# Patient Record
Sex: MALE | Race: BLACK OR AFRICAN AMERICAN | Employment: OTHER | URBAN - METROPOLITAN AREA
[De-identification: names, ages, dates, MRNs, and addresses within clinical notes are randomized per-mention and may not be internally consistent; named-entity substitution may affect disease eponyms.]

---

## 2022-02-15 ENCOUNTER — OFFICE VISIT (OUTPATIENT)
Dept: ORTHOPEDIC SURGERY | Age: 77
End: 2022-02-15
Payer: MEDICARE

## 2022-02-15 VITALS — WEIGHT: 182 LBS | HEIGHT: 66 IN | BODY MASS INDEX: 29.25 KG/M2

## 2022-02-15 DIAGNOSIS — M54.50 LUMBAR BACK PAIN: Primary | ICD-10-CM

## 2022-02-15 DIAGNOSIS — Z98.1 HISTORY OF LUMBAR SPINAL FUSION: ICD-10-CM

## 2022-02-15 PROCEDURE — G8432 DEP SCR NOT DOC, RNG: HCPCS | Performed by: PHYSICIAN ASSISTANT

## 2022-02-15 PROCEDURE — G8419 CALC BMI OUT NRM PARAM NOF/U: HCPCS | Performed by: PHYSICIAN ASSISTANT

## 2022-02-15 PROCEDURE — G8427 DOCREV CUR MEDS BY ELIG CLIN: HCPCS | Performed by: PHYSICIAN ASSISTANT

## 2022-02-15 PROCEDURE — 99212 OFFICE O/P EST SF 10 MIN: CPT | Performed by: PHYSICIAN ASSISTANT

## 2022-02-15 PROCEDURE — G8536 NO DOC ELDER MAL SCRN: HCPCS | Performed by: PHYSICIAN ASSISTANT

## 2022-02-15 PROCEDURE — 1101F PT FALLS ASSESS-DOCD LE1/YR: CPT | Performed by: PHYSICIAN ASSISTANT

## 2022-02-15 NOTE — PROGRESS NOTES
Jay Diez (: 1945) is a 68 y.o. male, established patient, here for evaluation of the following chief complaint(s):  Back Pain       SUBJECTIVE/OBJECTIVE:  Jay Diez (: 1945) is a 68 y.o. male. Lumbar laminectomy and fusion L4-S1 2019 with Dr. Venkat Moore    Patient states he is doing well at this time and is having minimal if any back discomfort. Patient states that he is active and plays golf and walks regularly for exercise without discomfort. Patient states he has occasional \"tightness\" in the right lateral thigh. Patient states he does not take any pain medication for his back on a routine basis. Patient states that his back does not limit his activities. Patient rates his current level of pain as a 1/10. Patient denies lower extremity numbness, tingling or weakness. No Known Allergies    Current Outpatient Medications   Medication Sig    lisinopril (PRINIVIL, ZESTRIL) 10 mg tablet Take 10 mg by mouth daily.  rosuvastatin (CRESTOR) 10 mg tablet Take 10 mg by mouth nightly.  cholecalciferol (VITAMIN D3) 1,000 unit tablet Take  by mouth daily.  valACYclovir (VALTREX) 500 mg tablet Take 500 mg by mouth two (2) times a day.  HYDROcodone-acetaminophen (NORCO) 5-325 mg per tablet Take 1 Tab by mouth every four (4) hours as needed for Pain.  ondansetron hcl (ZOFRAN) 4 mg tablet Take 1 Tab by mouth every eight (8) hours as needed for Nausea.  loperamide (IMODIUM) 2 mg capsule Take 1 Cap by mouth four (4) times daily as needed for Diarrhea. No current facility-administered medications for this visit.        Social History     Socioeconomic History    Marital status:      Spouse name: Not on file    Number of children: Not on file    Years of education: Not on file    Highest education level: Not on file   Occupational History    Not on file   Tobacco Use    Smoking status: Former Smoker    Smokeless tobacco: Not on file   Substance and Sexual Activity    Alcohol use: No    Drug use: No    Sexual activity: Not on file   Other Topics Concern    Not on file   Social History Narrative    Not on file     Social Determinants of Health     Financial Resource Strain:     Difficulty of Paying Living Expenses: Not on file   Food Insecurity:     Worried About Running Out of Food in the Last Year: Not on file    Isadora of Food in the Last Year: Not on file   Transportation Needs:     Lack of Transportation (Medical): Not on file    Lack of Transportation (Non-Medical): Not on file   Physical Activity:     Days of Exercise per Week: Not on file    Minutes of Exercise per Session: Not on file   Stress:     Feeling of Stress : Not on file   Social Connections:     Frequency of Communication with Friends and Family: Not on file    Frequency of Social Gatherings with Friends and Family: Not on file    Attends Sikh Services: Not on file    Active Member of [address] [place] [address] or Organizations: Not on file    Attends Club or Organization Meetings: Not on file    Marital Status: Not on file   Intimate Partner Violence:     Fear of Current or Ex-Partner: Not on file    Emotionally Abused: Not on file    Physically Abused: Not on file    Sexually Abused: Not on file   Housing Stability:     Unable to Pay for Housing in the Last Year: Not on file    Number of [place] in the Last Year: Not on file    Unstable Housing in the Last Year: Not on file       Past Surgical History:   Procedure Laterality Date    HX HERNIA REPAIR      left lower groin    HX KNEE ARTHROSCOPY      right knee    HX ORTHOPAEDIC      right foot surgery     HX SHOULDER ARTHROSCOPY         No family history on file. REVIEW OF SYSTEMS:    Patient denies any recent fever, chills, nausea, vomiting, chest pain, abdominal pain, blurred vision, dizziness or shortness of breath.       Vitals:  Ht 5' 6\" (1.676 m)   Wt 82.6 kg (182 lb)   BMI 29.38 kg/m²    Body mass index is 29.38 kg/m². PHYSICAL EXAM:    The patient is alert and oriented x3 and in no acute distress. Patient ambulates with a normal gait without gait aids. Sensory testing in all major nerve distributions in the lower extremities is intact and symmetric. Manual motor testing of the major muscle groups of the lower extremities including dorsiflexion/EHL/plantarflexion, knee flexion/extension, hip flexion/extension/abduction/adduction is intact and symmetric in the bilateral lower extremities. Deep tendon reflexes +2/4 and symmetric in the bilateral knees and ankles. Negative straight leg raise bilaterally. No evidence of clonus bilaterally. Babinski's downgoing and symmetric bilaterally. Distal pulses 2+ and symmetric bilaterally. There is no tenderness to palpation of the thoracic, lumbar or sacral regions. IMAGING:    AP, lateral, flexion and extension digital view radiographs of the lumbar spine obtained in the office today and was reviewed and demonstrate good alignment of posterior instrumentation L4-S1 with no evidence of hardware loosening, subsidence or failure. There is bony arthrodesis in the bilateral gutters. The adjacent segment above the proximal fusion level at L3-L4 remains well maintained there are no gross motion segments seen on flexion/extension views. Overall lumbar alignment is lordotic. There is no evidence of fracture, lytic lesion or acute bony abnormality. Orders Placed This Encounter    XR SPINE LUMB MIN 4 V     2B     Standing Status:   Future     Number of Occurrences:   1     Standing Expiration Date:   2/16/2023          ASSESSMENT/PLAN:      1. Lumbar back pain  -     XR SPINE LUMB MIN 4 V; Future  2. History of lumbar spinal fusion        Below is the assessment and plan developed based on review of pertinent history, physical exam, labs, studies, and medications. Discussed radiographic findings and answered all patient questions to his satisfaction.   Patient was encouraged to remain active and to be participate in low impact activities as tolerated. Patient was asked to contact the office with any questions or concerns. We will plan on seeing the patient back for follow-up on an as-needed basis. The patient understands and aggrees to the treatment plan as outlined above. Dr. Jovanny Mcclelland was available for immediate consultation as needed. Return if symptoms worsen or fail to improve. Dr. Jovanny Mcclelland was available for immediate consultation as needed. An electronic signature was used to authenticate this note.   -- Cass Cuevas PA-C

## 2023-01-17 RX ORDER — ATORVASTATIN CALCIUM 20 MG/1
TABLET, FILM COATED ORAL DAILY
COMMUNITY

## 2023-01-17 RX ORDER — ASPIRIN 81 MG/1
81 TABLET ORAL DAILY
COMMUNITY

## 2023-01-17 RX ORDER — IRBESARTAN 150 MG/1
150 TABLET ORAL DAILY
COMMUNITY

## 2023-01-17 RX ORDER — CHOLECALCIFEROL TAB 125 MCG (5000 UNIT) 125 MCG
TAB ORAL DAILY
COMMUNITY

## 2023-01-17 RX ORDER — ASCORBIC ACID 500 MG
1000 TABLET ORAL DAILY
COMMUNITY

## 2023-01-17 RX ORDER — LANOLIN ALCOHOL/MO/W.PET/CERES
2500 CREAM (GRAM) TOPICAL DAILY
COMMUNITY

## 2023-01-17 RX ORDER — MULTIVITAMIN
1 TABLET ORAL DAILY
COMMUNITY

## 2023-01-17 NOTE — PERIOP NOTES
As  per patient, he was not given instructions yet regarding Aspirin medication pre-op. 1511: Spoke to [place] at Dr. Dianna Mendoza office and as per same patient can take Aspirin pre-op. 1523: Spoke to patient and instructed that he can take his Aspirin medication as scheduled. No need to stop pre-op. Able to verbalized understanding.

## 2023-01-17 NOTE — PERIOP NOTES
[facility]  Ambulatory Surgery Unit  Pre-operative Instructions    Surgery/Procedure Date      1/24/2023     Tentative Arrival Time TBD      1. On the day of your surgery/procedure, please report to the Ambulatory Surgery Unit Registration Desk and sign in at your designated time. The Ambulatory Surgery Unit is located in [facility] on the [facility] side of the [facility] across from the [address] [place] [address]. Please have all of your health insurance cards, co-payment, and a photo ID.    **TWO adults may accompany you the day of the procedure. We have limited seating available. If our waiting room is at capacity, your ride may be asked to remain in their vehicle. No one under 15 is allowed in the waiting room. 2. You must have someone with you to drive you home, as you should not drive a car for 24 hours following anesthesia. Please make arrangements for a responsible adult friend or family member to stay with you for at least the first 24 hours after your surgery. 3. Do not have anything to eat or drink (including water, gum, mints, coffee, juice) after 11:59 PM  1/23/23. This may not apply to medications prescribed by your physician. (Please note below the special instructions with medications to take the morning of surgery, if applicable.)    4. We recommend you do not drink any alcoholic beverages for 24 hours before and after your surgery. 5. Contact your surgeons office for instructions on the following medications: non-steroidal anti-inflammatory drugs (i.e. Advil, Aleve), vitamins, and supplements. (Some surgeons will want you to stop these medications prior to surgery and others may allow you to take them)   **If you are currently taking Plavix, Coumadin, Aspirin and/or other blood-thinning agents, contact your surgeon for instructions. ** Your surgeon will partner with the physician prescribing these medications to determine if it is safe to stop or if you need to continue taking. Please do not stop taking these medications without instructions from your surgeon. 6. In an effort to help prevent surgical site infection, we ask that you shower with an anti-bacterial soap (i.e. Dial/Safeguard, or the soap provided to you at your preadmission testing appointment)  on the morning of surgery, using a fresh towel after each shower. (Please begin this process with fresh bed linens.) Do not apply any lotions, powders, or deodorants after the shower on the day of your procedure. If applicable, please do not shave the operative site for 48 hours prior to surgery. 7. Wear comfortable clothes. Wear glasses instead of contacts. Do not bring any jewelry or money (other than copays or fees as instructed). Do not wear make-up, particularly mascara, the morning of your surgery. Do not wear nail polish, particularly if you are having foot /hand surgery. Wear your hair loose or down, no ponytails, buns, que pins or clips. All body piercings must be removed. 8. You should understand that if you do not follow these instructions your surgery may be cancelled. If your physical condition changes (i.e. fever, cold or flu) please contact your surgeon as soon as possible. 9. It is important that you be on time. If a situation occurs where you may be late, or if you have any questions or problems, please call [phone].    10. Your surgery time may be subject to change. You will receive a phone call the day prior to surgery to confirm your arrival time. 11. Pediatric patients: please bring a change of clothes, diapers, bottle/sippy cup, pacifier, etc.      Special Instructions: Take all medications and inhalers, as prescribed, on the morning of surgery with a sip of water . Insulin Dependent Diabetic patients: Take your diabetic medications as prescribed the day before surgery. Hold all diabetic medications the day of surgery.     If you are scheduled to arrive for surgery after 8:00 AM, and your AM blood sugar is >200, please call Ambulatory Surgery. I understand a pre-operative phone call will be made to verify my surgery time. In the event that I am not available, I give permission for a message to be left on my answering service and/or with another person?       Yes    Reviewed instructions with patient, able to verbalized understanding.         ___________________      ___________________      ________________  (Signature of Patient)          (Witness)                   (Date and Time)

## 2023-01-23 ENCOUNTER — ANESTHESIA EVENT (OUTPATIENT)
Dept: SURGERY | Age: 78
End: 2023-01-23
Payer: MEDICARE

## 2023-01-24 ENCOUNTER — HOSPITAL ENCOUNTER (OUTPATIENT)
Age: 78
Setting detail: OUTPATIENT SURGERY
Discharge: HOME | End: 2023-01-24
Attending: OPHTHALMOLOGY | Admitting: OPHTHALMOLOGY
Payer: MEDICARE

## 2023-01-24 ENCOUNTER — ANESTHESIA (OUTPATIENT)
Dept: SURGERY | Age: 78
End: 2023-01-24
Payer: MEDICARE

## 2023-01-24 VITALS
RESPIRATION RATE: 24 BRPM | WEIGHT: 184 LBS | SYSTOLIC BLOOD PRESSURE: 151 MMHG | TEMPERATURE: 97.2 F | OXYGEN SATURATION: 99 % | DIASTOLIC BLOOD PRESSURE: 83 MMHG | HEART RATE: 60 BPM | HEIGHT: 66 IN | BODY MASS INDEX: 29.57 KG/M2

## 2023-01-24 PROCEDURE — 76210000046 HC AMBSU PH II REC FIRST 0.5 HR: Performed by: OPHTHALMOLOGY

## 2023-01-24 PROCEDURE — 74011000250 HC RX REV CODE- 250: Performed by: OPHTHALMOLOGY

## 2023-01-24 PROCEDURE — 74011250636 HC RX REV CODE- 250/636: Performed by: OPHTHALMOLOGY

## 2023-01-24 PROCEDURE — 76060000073 HC AMB SURG ANES FIRST 0.5 HR: Performed by: OPHTHALMOLOGY

## 2023-01-24 PROCEDURE — 74011000250 HC RX REV CODE- 250

## 2023-01-24 PROCEDURE — 74011250636 HC RX REV CODE- 250/636: Performed by: NURSE ANESTHETIST, CERTIFIED REGISTERED

## 2023-01-24 PROCEDURE — V2632 POST CHMBR INTRAOCULAR LENS: HCPCS | Performed by: OPHTHALMOLOGY

## 2023-01-24 PROCEDURE — 2709999900 HC NON-CHARGEABLE SUPPLY: Performed by: OPHTHALMOLOGY

## 2023-01-24 PROCEDURE — 76030000002 HC AMB SURG OR TIME FIRST 0.: Performed by: OPHTHALMOLOGY

## 2023-01-24 PROCEDURE — 77030006709 HC BLD OPHTH SSPC -B: Performed by: OPHTHALMOLOGY

## 2023-01-24 PROCEDURE — 74011000250 HC RX REV CODE- 250: Performed by: NURSE ANESTHETIST, CERTIFIED REGISTERED

## 2023-01-24 DEVICE — ACRYSOF(R) IQ ASPHERIC NATURAL IOL, SINGLE-PIECE ACRYLIC FOLDABLE PCL, UV WITH BLUE LIGHTFILTER, 13.0MM LENGTH, 6.0MM ANTERIORASYMMETRIC BICONVEX OPTIC, PLANAR HAPTICS.
Type: IMPLANTABLE DEVICE | Site: EYE | Status: FUNCTIONAL
Brand: ACRYSOF®

## 2023-01-24 RX ORDER — NEOMYCIN SULFATE, POLYMYXIN B SULFATE, AND DEXAMETHASONE 3.5; 10000; 1 MG/G; [USP'U]/G; MG/G
OINTMENT OPHTHALMIC AS NEEDED
Status: DISCONTINUED | OUTPATIENT
Start: 2023-01-24 | End: 2023-01-24 | Stop reason: HOSPADM

## 2023-01-24 RX ORDER — SODIUM CHLORIDE 9 MG/ML
INJECTION, SOLUTION INTRAVENOUS
Status: DISCONTINUED | OUTPATIENT
Start: 2023-01-24 | End: 2023-01-24 | Stop reason: HOSPADM

## 2023-01-24 RX ORDER — TROPICAMIDE 10 MG/ML
SOLUTION/ DROPS OPHTHALMIC
Status: COMPLETED
Start: 2023-01-24 | End: 2023-01-24

## 2023-01-24 RX ORDER — MIDAZOLAM HYDROCHLORIDE 1 MG/ML
1 INJECTION, SOLUTION INTRAMUSCULAR; INTRAVENOUS AS NEEDED
Status: DISCONTINUED | OUTPATIENT
Start: 2023-01-24 | End: 2023-01-24 | Stop reason: HOSPADM

## 2023-01-24 RX ORDER — FENTANYL CITRATE 50 UG/ML
25 INJECTION, SOLUTION INTRAMUSCULAR; INTRAVENOUS
Status: DISCONTINUED | OUTPATIENT
Start: 2023-01-24 | End: 2023-01-24 | Stop reason: HOSPADM

## 2023-01-24 RX ORDER — ONDANSETRON 2 MG/ML
4 INJECTION INTRAMUSCULAR; INTRAVENOUS AS NEEDED
Status: DISCONTINUED | OUTPATIENT
Start: 2023-01-24 | End: 2023-01-24 | Stop reason: HOSPADM

## 2023-01-24 RX ORDER — PROPARACAINE HYDROCHLORIDE 5 MG/ML
1 SOLUTION/ DROPS OPHTHALMIC
Status: DISCONTINUED | OUTPATIENT
Start: 2023-01-24 | End: 2023-01-24 | Stop reason: HOSPADM

## 2023-01-24 RX ORDER — SODIUM CHLORIDE, SODIUM LACTATE, POTASSIUM CHLORIDE, CALCIUM CHLORIDE 600; 310; 30; 20 MG/100ML; MG/100ML; MG/100ML; MG/100ML
25 INJECTION, SOLUTION INTRAVENOUS CONTINUOUS
Status: DISCONTINUED | OUTPATIENT
Start: 2023-01-24 | End: 2023-01-24 | Stop reason: HOSPADM

## 2023-01-24 RX ORDER — FENTANYL CITRATE 50 UG/ML
50 INJECTION, SOLUTION INTRAMUSCULAR; INTRAVENOUS AS NEEDED
Status: DISCONTINUED | OUTPATIENT
Start: 2023-01-24 | End: 2023-01-24 | Stop reason: HOSPADM

## 2023-01-24 RX ORDER — DICLOFENAC SODIUM 1 MG/ML
1 SOLUTION/ DROPS OPHTHALMIC
Status: COMPLETED | OUTPATIENT
Start: 2023-01-24 | End: 2023-01-24

## 2023-01-24 RX ORDER — LIDOCAINE HYDROCHLORIDE 10 MG/ML
0.1 INJECTION, SOLUTION EPIDURAL; INFILTRATION; INTRACAUDAL; PERINEURAL AS NEEDED
Status: DISCONTINUED | OUTPATIENT
Start: 2023-01-24 | End: 2023-01-24 | Stop reason: HOSPADM

## 2023-01-24 RX ORDER — PROPOFOL 10 MG/ML
INJECTION, EMULSION INTRAVENOUS AS NEEDED
Status: DISCONTINUED | OUTPATIENT
Start: 2023-01-24 | End: 2023-01-24 | Stop reason: HOSPADM

## 2023-01-24 RX ORDER — HYDROMORPHONE HYDROCHLORIDE 1 MG/ML
.2-.5 INJECTION, SOLUTION INTRAMUSCULAR; INTRAVENOUS; SUBCUTANEOUS
Status: DISCONTINUED | OUTPATIENT
Start: 2023-01-24 | End: 2023-01-24 | Stop reason: HOSPADM

## 2023-01-24 RX ORDER — TROPICAMIDE 10 MG/ML
1 SOLUTION/ DROPS OPHTHALMIC
Status: COMPLETED | OUTPATIENT
Start: 2023-01-24 | End: 2023-01-24

## 2023-01-24 RX ORDER — TETRACAINE HYDROCHLORIDE 5 MG/ML
1 SOLUTION OPHTHALMIC
Status: ACTIVE | OUTPATIENT
Start: 2023-01-24 | End: 2023-01-24

## 2023-01-24 RX ORDER — LIDOCAINE HYDROCHLORIDE 20 MG/ML
INJECTION, SOLUTION EPIDURAL; INFILTRATION; INTRACAUDAL; PERINEURAL AS NEEDED
Status: DISCONTINUED | OUTPATIENT
Start: 2023-01-24 | End: 2023-01-24 | Stop reason: HOSPADM

## 2023-01-24 RX ORDER — SODIUM CHLORIDE 9 MG/ML
25 INJECTION, SOLUTION INTRAVENOUS CONTINUOUS
Status: DISCONTINUED | OUTPATIENT
Start: 2023-01-24 | End: 2023-01-24 | Stop reason: HOSPADM

## 2023-01-24 RX ORDER — CYCLOPENTOLATE HYDROCHLORIDE 20 MG/ML
1 SOLUTION/ DROPS OPHTHALMIC
Status: COMPLETED | OUTPATIENT
Start: 2023-01-24 | End: 2023-01-24

## 2023-01-24 RX ORDER — NEOMYCIN SULFATE, POLYMYXIN B SULFATE, AND DEXAMETHASONE 3.5; 10000; 1 MG/G; [USP'U]/G; MG/G
OINTMENT OPHTHALMIC 4 TIMES DAILY
Status: DISCONTINUED | OUTPATIENT
Start: 2023-01-24 | End: 2023-01-24 | Stop reason: HOSPADM

## 2023-01-24 RX ORDER — DICLOFENAC SODIUM 1 MG/ML
SOLUTION/ DROPS OPHTHALMIC
Status: COMPLETED
Start: 2023-01-24 | End: 2023-01-24

## 2023-01-24 RX ORDER — PHENYLEPHRINE HYDROCHLORIDE 25 MG/ML
1 SOLUTION/ DROPS OPHTHALMIC
Status: COMPLETED | OUTPATIENT
Start: 2023-01-24 | End: 2023-01-24

## 2023-01-24 RX ADMIN — CYCLOPENTOLATE HYDROCHLORIDE 1 DROP: 20 SOLUTION/ DROPS OPHTHALMIC at 13:14

## 2023-01-24 RX ADMIN — DICLOFENAC SODIUM 1 DROP: 1 SOLUTION/ DROPS OPHTHALMIC at 13:09

## 2023-01-24 RX ADMIN — TROPICAMIDE 1 DROP: 10 SOLUTION/ DROPS OPHTHALMIC at 13:14

## 2023-01-24 RX ADMIN — CYCLOPENTOLATE HYDROCHLORIDE 1 DROP: 20 SOLUTION/ DROPS OPHTHALMIC at 13:21

## 2023-01-24 RX ADMIN — TROPICAMIDE 1 DROP: 10 SOLUTION/ DROPS OPHTHALMIC at 13:21

## 2023-01-24 RX ADMIN — SODIUM CHLORIDE 25 ML/HR: 9 INJECTION, SOLUTION INTRAVENOUS at 13:06

## 2023-01-24 RX ADMIN — TROPICAMIDE 1 DROP: 10 SOLUTION/ DROPS OPHTHALMIC at 13:09

## 2023-01-24 RX ADMIN — CYCLOPENTOLATE HYDROCHLORIDE 1 DROP: 20 SOLUTION/ DROPS OPHTHALMIC at 13:09

## 2023-01-24 RX ADMIN — PROPARACAINE HYDROCHLORIDE 1 DROP: 5 SOLUTION/ DROPS OPHTHALMIC at 13:08

## 2023-01-24 RX ADMIN — DICLOFENAC SODIUM 1 DROP: 1 SOLUTION/ DROPS OPHTHALMIC at 13:21

## 2023-01-24 RX ADMIN — PROPOFOL 50 MG: 10 INJECTION, EMULSION INTRAVENOUS at 13:36

## 2023-01-24 RX ADMIN — DICLOFENAC SODIUM 1 DROP: 1 SOLUTION/ DROPS OPHTHALMIC at 13:14

## 2023-01-24 RX ADMIN — PHENYLEPHRINE HYDROCHLORIDE 1 DROP: 25 SOLUTION/ DROPS OPHTHALMIC at 13:21

## 2023-01-24 RX ADMIN — SODIUM CHLORIDE: 0.9 INJECTION, SOLUTION INTRAVENOUS at 13:33

## 2023-01-24 RX ADMIN — PHENYLEPHRINE HYDROCHLORIDE 1 DROP: 25 SOLUTION/ DROPS OPHTHALMIC at 13:08

## 2023-01-24 RX ADMIN — LIDOCAINE HYDROCHLORIDE 40 MG: 20 INJECTION, SOLUTION EPIDURAL; INFILTRATION; INTRACAUDAL; PERINEURAL at 13:36

## 2023-01-24 RX ADMIN — PHENYLEPHRINE HYDROCHLORIDE 1 DROP: 25 SOLUTION/ DROPS OPHTHALMIC at 13:14

## 2023-01-24 NOTE — ANESTHESIA POSTPROCEDURE EVALUATION
Procedure(s):  LEFT EYE PHACOEMULSIFICATION WITH INTRA OCULAR LENS IMPLANT (MAC WITH RETROBULBAR). MAC    Anesthesia Post Evaluation      Multimodal analgesia: multimodal analgesia used between 6 hours prior to anesthesia start to PACU discharge  Patient location during evaluation: PACU  Patient participation: complete - patient participated  Level of consciousness: sleepy but conscious  Pain management: adequate  Airway patency: patent  Anesthetic complications: no  Cardiovascular status: acceptable, blood pressure returned to baseline and hemodynamically stable  Respiratory status: acceptable and nasal cannula  Hydration status: acceptable  Post anesthesia nausea and vomiting:  none  Final Post Anesthesia Temperature Assessment:  Normothermia (36.0-37.5 degrees C)      INITIAL Post-op Vital signs:   Vitals Value Taken Time   /83 01/24/23 1422   Temp 36.2 °C (97.2 °F) 01/24/23 1416   Pulse 63 01/24/23 1423   Resp 16 01/24/23 1423   SpO2 96 % 01/24/23 1422   Vitals shown include unvalidated device data.

## 2023-01-24 NOTE — PERIOP NOTES
Permission received to review discharge instructions and discuss private health information with Yenifer Robledo, wife.

## 2023-01-24 NOTE — PERIOP NOTES
Iman Parent  1945  [id number]    Situation:  Verbal report given from: Flako Schwartz CRNA, Chris Murdock RN  Procedure: Procedure(s):  LEFT EYE PHACOEMULSIFICATION WITH INTRA OCULAR LENS IMPLANT (MAC WITH RETROBULBAR)    Background:    Preoperative diagnosis: Combined forms of age-related cataract of left eye [H25.812]    Postoperative diagnosis: Combined forms of age-related cataract of left eye [H25.812]    :  Dr. Rika Sterling    Assistant(s): Circ-1: Chris Taylor RN  Circ-2: Murtaza Pratt RN  Scrub Tech-1: Jordyn Ga    Specimens: * No specimens in log *    Assessment:  Intra-procedure medications         Anesthesia gave intra-procedure sedation and medications, see anesthesia flow sheet     Intravenous fluids: LR@ KVO     Vital signs stable       Recommendation:

## 2023-01-24 NOTE — PERIOP NOTES
Pt denies pain in eye. Pt tolerating liquids, vss.  D/c instructions reviewed, all questions answered. Reviewed when to call the doctor,diet, activity and hygiene. Reviewed what/when to take pain meds, follow up appt and when to start eye drops. 1430-Transported via w/c to awaiting transportation. No complaints noted at time of d/c home.

## 2023-01-24 NOTE — BRIEF OP NOTE
Brief Postoperative Note    Patient: Amanda Pro  YOB: 1945  MRN: [id number]    Date of Procedure: 1/24/2023     Pre-Op Diagnosis: Combined forms of age-related cataract of left eye [H25.812]    Post-Op Diagnosis: Same as preoperative diagnosis. Procedure(s):  LEFT EYE PHACOEMULSIFICATION WITH INTRA OCULAR LENS IMPLANT (MAC WITH RETROBULBAR)    Surgeon(s):  Bree Meyers MD    Surgical Assistant: None    Anesthesia: MAC     Estimated Blood Loss (mL): Minimal    Complications: None    Specimens: * No specimens in log *     Implants:   Implant Name Type Inv.  Item Serial No.  Lot No. LRB No. Used Action   LENS INTOCU 6.0 TO 30.0 DIOPT 118.7 A CONSTANT 0DEG ANG - [id number] Other LENS INTOCU 6.0 TO 30.0 DIOPT 118.7 A CONSTANT 0DEG ANG [id number] [employer] N/A Left 1 Implanted       Drains: * No LDAs found *    Findings: none    Electronically Signed by Jadiel Medina MD on 1/24/2023 at 2:18 PM

## 2023-01-24 NOTE — DISCHARGE INSTRUCTIONS
Dr. Willian Schulte and [address].  [place], [address] [place] [address]  [phone]    Cataract Post Operative Instructions    Diet - you may eat a normal diet but avoid spicy or greasy tonight. Activity - Limit heavy lifting - do not lift more than 20 pounds for the next 7 days. Leave your eye patch on tonight. Your eye should remain closed under the patch. Your patch will be removed in Dr. Elvis Amaya office tomorrow. Most people do not have significant pain after cataract surgery. You may take any over-the-counter pain medication that you normally take as needed. You may resume all of your pre-operative medications. You should have your postoperative drops. If you do not[facility] You will start eyedrops TOMORROW. You have an appointment with Dr. Audrey Somers tomorrow in her office. Date: ___1/25/2023_____________ Time: _____10:00am____________    If you need to speak to Dr. Audrey Somers after business hours, please call [phone]. DO NOT TAKE SLEEPING MEDICATIONS OR ANTIANXIETY MEDICATIONS WHILE TAKING NARCOTIC PAIN MEDICATIONS,  ESPECIALLY THE NIGHT OF ANESTHESIA. CPAP PATIENTS BE SURE TO WEAR MACHINE WHENEVER NAPPING OR SLEEPING. DISCHARGE SUMMARY from Nurse    The following personal items collected during your admission are returned to you:   Dental Appliance: Dental Appliances: None  Vision: Visual Aid: Glasses, At home  Hearing Aid:    Jewelry: Jewelry: None  Clothing: Clothing: With patient  Other Valuables: Other Valuables: None  Valuables sent to safe:        PATIENT INSTRUCTIONS:    Anesthesia Discharge Instructions for Procedural Area requiring Sedation (MAC Anesthesia, Cath Lab, Endo and Radiology): You have been given medications during your procedure that may affect your memory and mental judgement for the next 24 hours.  During this time frame for your safety, please follow the instructions listed below :   Have a responsible adult to drive you home and be with you for at least 24 hours. Rest today and resume normal activities tomorrow. Start with a soft bland diet and advance as tolerated to your recommended diet. Do not drive any motor vehicle or operate mechanical or electrical equipment prior to [employer]. Avoid making critical decisions or signing legal documents prior to 6am tomorrow. Do not drink alcohol prior to 6am tomorrow. If you have sleep apnea and you plan to go home and take a nap, please use your CPAP machine not only at bedtime, but also while napping for 24 hours. If you notice any redness or swelling on parts of your body where IV medications were given, place a warm wet washcloth over the area for 20 minutes at a time until the redness or swelling goes away. If you still have redness or swelling after 2-3 days, please call us. You will receive a Post Operative Call from one of the Recovery Room Nurses on the day after your surgery to check on you. It is very important for us to know how you are recovering after your surgery. If you have an issue or need to speak with someone, please call your surgeon, do not wait for the post operative call. You may receive an e-mail or letter in the mail from [employer] regarding your experience with us in the Ambulatory Surgery Unit. Your feedback is valuable to us and we appreciate your participation in the survey. We wish you a speedy recovery ? What to do at Home:      *  Please give a list of your current medications to your Primary Care Provider. *  Please update this list whenever your medications are discontinued, doses are      changed, or new medications (including over-the-counter products) are added. *  Please carry medication information at all times in case of emergency situations.                   If you have not received your influenza and/or pneumococcal vaccine, please follow up with your primary care physician. The discharge information has been reviewed with the patient and caregiver. The patient and caregiver verbalized understanding.

## 2023-01-24 NOTE — ANESTHESIA PREPROCEDURE EVALUATION
Relevant Problems   No relevant active problems       Anesthetic History   No history of anesthetic complications            Review of Systems / Medical History  Patient summary reviewed, nursing notes reviewed and pertinent labs reviewed    Pulmonary  Within defined limits                 Neuro/Psych   Within defined limits           Cardiovascular    Hypertension          CAD, PAD and hyperlipidemia         GI/Hepatic/Renal         Renal disease       Endo/Other  Within defined limits           Other Findings            Physical Exam    Airway  Mallampati: II  TM Distance: > 6 cm  Neck ROM: normal range of motion   Mouth opening: Normal     Cardiovascular    Rhythm: regular  Rate: normal         Dental  No notable dental hx       Pulmonary  Breath sounds clear to auscultation               Abdominal  GI exam deferred       Other Findings            Anesthetic Plan    ASA: 3  Anesthesia type: MAC          Induction: Intravenous  Anesthetic plan and risks discussed with: Patient

## 2023-01-24 NOTE — OP NOTES
Operative Note    Operative Report    Patient: Eloise Rice MRN: [id number]  SSN: [id number]    YOB: 1945  Age: 68 y.o. Sex: male      Indications: The patient complains of painless progressive visual loss and reports difficulty with activities of daily living. Cataract surgery has been recommended to improve vision for activities of daily living, and the patient has elected to proceed. Date of Surgery: 1/24/2023     Preoperative Diagnosis: Combined forms of age-related cataract of left eye [H25.812]    Postoperative Diagnosis: Combined forms of age-related cataract of left eye [H25.812]    Surgeon(s) and Role:     * Lord Hugo MD - Primary     Anesthesia:  MAC     Procedure: Procedure(s):  LEFT EYE PHACOEMULSIFICATION WITH INTRA OCULAR LENS IMPLANT (MAC WITH RETROBULBAR)     Procedure in Detail:  After informed consent was obtained, the patient was brought into the operating suite. MAC with sedation and a retrobulbar block using a 50/50 mixture of lidocaine 2% and Marcaine 0.75% with added Amphatase was administered without complication. The patient was prepped and draped in the usual sterile ophthalmic fashion. A wire lid speculum was placed. A paracentesis was made using a 75 blade. The eye was filled with Provisc. A 2.8mm keratome was used to make a temporal clear corneal incision. A cystotome and Utrada forcep was used to make a continuous curvilinear capsulorrhexis without complication. Hydrodisection was performed until the nuclear rotated freely in the capsular bag. The cataract was removed using a two handed divide and conquer technique using a Norm as a second instrument. Irrigation/aspiration was used to remove the remaining cortex. Capsular polish was used as needed. The bag was inflated using Provisc. An intraocular lens was placed into the bag without complication. Irrigation/aspiration was used to remove the viscoelastic.   The chamber was filled with BSS and the wound hydrated. The wound was found to be watertight. Subconjunctival injections of ancef and decadron were given. The lid speculum and drape was removed. The eye was dressed with ointment, eye pads, and eye shield. The patient was taken to the recover room having tolerated the procedure well. Findings:  None         Estimated Blood Loss:  Minimal    Specimens: * No specimens in log *                Implants:   Implant Name Type Inv.  Item Serial No.  Lot No. LRB No. Used Action   LENS INTOCU 6.0 TO 30.0 DIOPT 118.7 A CONSTANT 0DEG ANG - [id number] Other LENS INTOCU 6.0 TO 30.0 DIOPT 118.7 A CONSTANT 0DEG [facility] [id number] [employer] N/A Left 1 Implanted              Complications:  None

## 2023-01-30 NOTE — PERIOP NOTES
[facility]  Ambulatory Surgery Unit  Pre-operative Instructions    Surgery/Procedure Date  2/7/2023            Tentative Arrival Time TBD      1. On the day of your surgery/procedure, please report to the Ambulatory Surgery Unit Registration Desk and sign in at your designated time. The Ambulatory Surgery Unit is located in [facility] on the [facility] side of the [facility] across from the [address] [place] [address]. Please have all of your health insurance cards, co-payment, and a photo ID.    **TWO adults may accompany you the day of the procedure. We have limited seating available. If our waiting room is at capacity, your ride may be asked to remain in their vehicle. No one under 15 is allowed in the waiting room. 2. You must have someone with you to drive you home, as you should not drive a car for 24 hours following anesthesia. Please make arrangements for a responsible adult friend or family member to stay with you for at least the first 24 hours after your surgery. 3. Do not have anything to eat or drink (including water, gum, mints, coffee, juice) after 11:59 PM  2/6/2023. This may not apply to medications prescribed by your physician. (Please note below the special instructions with medications to take the morning of surgery, if applicable.)    4. We recommend you do not drink any alcoholic beverages for 24 hours before and after your surgery. 5. Contact your surgeons office for instructions on the following medications: non-steroidal anti-inflammatory drugs (i.e. Advil, Aleve), vitamins, and supplements. (Some surgeons will want you to stop these medications prior to surgery and others may allow you to take them)   **If you are currently taking Plavix, Coumadin, Aspirin and/or other blood-thinning agents, contact your surgeon for instructions. ** Your surgeon will partner with the physician prescribing these medications to determine if it is safe to stop or if you need to continue taking. Please do not stop taking these medications without instructions from your surgeon. 6. In an effort to help prevent surgical site infection, we ask that you shower with an anti-bacterial soap (i.e. Dial/Safeguard, or the soap provided to you at your preadmission testing appointment)  on the morning of surgery, using a fresh towel after each shower. (Please begin this process with fresh bed linens.) Do not apply any lotions, powders, or deodorants after the shower on the day of your procedure. If applicable, please do not shave the operative site for 48 hours prior to surgery. 7. Wear comfortable clothes. Wear glasses instead of contacts. Do not bring any jewelry or money (other than copays or fees as instructed). Do not wear make-up, particularly mascara, the morning of your surgery. Do not wear nail polish, particularly if you are having foot /hand surgery. Wear your hair loose or down, no ponytails, buns, que pins or clips. All body piercings must be removed. 8. You should understand that if you do not follow these instructions your surgery may be cancelled. If your physical condition changes (i.e. fever, cold or flu) please contact your surgeon as soon as possible. 9. It is important that you be on time. If a situation occurs where you may be late, or if you have any questions or problems, please call [phone].    10. Your surgery time may be subject to change. You will receive a phone call the day prior to surgery to confirm your arrival time. 11. Pediatric patients: please bring a change of clothes, diapers, bottle/sippy cup, pacifier, etc.      Special Instructions: Take all medications and inhalers, as prescribed, on the morning of surgery with a sip of water . Insulin Dependent Diabetic patients: Take your diabetic medications as prescribed the day before surgery. Hold all diabetic medications the day of surgery.     If you are scheduled to arrive for surgery after 8:00 AM, and your AM blood sugar is >200, please call Ambulatory Surgery. I understand a pre-operative phone call will be made to verify my surgery time. In the event that I am not available, I give permission for a message to be left on my answering service and/or with another person?       Yes    Reviewed instructions with patient, able to verbalized understanding.         ___________________      ___________________      ________________  (Signature of Patient)          (Witness)                   (Date and Time)

## 2023-02-06 ENCOUNTER — ANESTHESIA EVENT (OUTPATIENT)
Dept: SURGERY | Age: 78
End: 2023-02-06
Payer: MEDICARE

## 2023-02-06 RX ORDER — FENTANYL CITRATE 50 UG/ML
25 INJECTION, SOLUTION INTRAMUSCULAR; INTRAVENOUS
Status: CANCELLED | OUTPATIENT
Start: 2023-02-06

## 2023-02-06 RX ORDER — ONDANSETRON 2 MG/ML
4 INJECTION INTRAMUSCULAR; INTRAVENOUS AS NEEDED
Status: CANCELLED | OUTPATIENT
Start: 2023-02-06

## 2023-02-06 RX ORDER — SODIUM CHLORIDE 0.9 % (FLUSH) 0.9 %
5-40 SYRINGE (ML) INJECTION AS NEEDED
Status: CANCELLED | OUTPATIENT
Start: 2023-02-06

## 2023-02-06 RX ORDER — SODIUM CHLORIDE, SODIUM LACTATE, POTASSIUM CHLORIDE, CALCIUM CHLORIDE 600; 310; 30; 20 MG/100ML; MG/100ML; MG/100ML; MG/100ML
25 INJECTION, SOLUTION INTRAVENOUS CONTINUOUS
Status: CANCELLED | OUTPATIENT
Start: 2023-02-06

## 2023-02-06 RX ORDER — DIPHENHYDRAMINE HYDROCHLORIDE 50 MG/ML
12.5 INJECTION, SOLUTION INTRAMUSCULAR; INTRAVENOUS AS NEEDED
Status: CANCELLED | OUTPATIENT
Start: 2023-02-06 | End: 2023-02-06

## 2023-02-06 RX ORDER — SODIUM CHLORIDE 0.9 % (FLUSH) 0.9 %
5-40 SYRINGE (ML) INJECTION EVERY 8 HOURS
Status: CANCELLED | OUTPATIENT
Start: 2023-02-06

## 2023-02-07 ENCOUNTER — ANESTHESIA (OUTPATIENT)
Dept: SURGERY | Age: 78
End: 2023-02-07
Payer: MEDICARE

## 2023-02-07 ENCOUNTER — HOSPITAL ENCOUNTER (OUTPATIENT)
Age: 78
Setting detail: OUTPATIENT SURGERY
Discharge: HOME | End: 2023-02-07
Attending: OPHTHALMOLOGY | Admitting: OPHTHALMOLOGY
Payer: MEDICARE

## 2023-02-07 VITALS
TEMPERATURE: 97.5 F | DIASTOLIC BLOOD PRESSURE: 93 MMHG | BODY MASS INDEX: 29.73 KG/M2 | WEIGHT: 185 LBS | SYSTOLIC BLOOD PRESSURE: 149 MMHG | HEART RATE: 64 BPM | OXYGEN SATURATION: 97 % | RESPIRATION RATE: 18 BRPM | HEIGHT: 66 IN

## 2023-02-07 PROCEDURE — 74011000250 HC RX REV CODE- 250: Performed by: NURSE ANESTHETIST, CERTIFIED REGISTERED

## 2023-02-07 PROCEDURE — 76210000040 HC AMBSU PH I REC FIRST 0.5 HR: Performed by: OPHTHALMOLOGY

## 2023-02-07 PROCEDURE — 77030006709 HC BLD OPHTH SSPC -B: Performed by: OPHTHALMOLOGY

## 2023-02-07 PROCEDURE — 76060000073 HC AMB SURG ANES FIRST 0.5 HR: Performed by: OPHTHALMOLOGY

## 2023-02-07 PROCEDURE — 74011000250 HC RX REV CODE- 250: Performed by: OPHTHALMOLOGY

## 2023-02-07 PROCEDURE — 77030028792 HC AGNT OPHTH DUOVISC ALCN -B: Performed by: OPHTHALMOLOGY

## 2023-02-07 PROCEDURE — 74011250636 HC RX REV CODE- 250/636: Performed by: OPHTHALMOLOGY

## 2023-02-07 PROCEDURE — 74011250636 HC RX REV CODE- 250/636: Performed by: NURSE ANESTHETIST, CERTIFIED REGISTERED

## 2023-02-07 PROCEDURE — 74011000250 HC RX REV CODE- 250

## 2023-02-07 PROCEDURE — 2709999900 HC NON-CHARGEABLE SUPPLY: Performed by: OPHTHALMOLOGY

## 2023-02-07 PROCEDURE — V2632 POST CHMBR INTRAOCULAR LENS: HCPCS | Performed by: OPHTHALMOLOGY

## 2023-02-07 PROCEDURE — 76210000046 HC AMBSU PH II REC FIRST 0.5 HR: Performed by: OPHTHALMOLOGY

## 2023-02-07 PROCEDURE — 76030000002 HC AMB SURG OR TIME FIRST 0.: Performed by: OPHTHALMOLOGY

## 2023-02-07 DEVICE — ACRYSOF(R) IQ ASPHERIC NATURAL IOL, SINGLE-PIECE ACRYLIC FOLDABLE PCL, UV WITH BLUE LIGHTFILTER, 13.0MM LENGTH, 6.0MM ANTERIORASYMMETRIC BICONVEX OPTIC, PLANAR HAPTICS.
Type: IMPLANTABLE DEVICE | Site: EYE | Status: FUNCTIONAL
Brand: ACRYSOF®

## 2023-02-07 RX ORDER — PROPARACAINE HYDROCHLORIDE 5 MG/ML
1 SOLUTION/ DROPS OPHTHALMIC
Status: COMPLETED | OUTPATIENT
Start: 2023-02-07 | End: 2023-02-07

## 2023-02-07 RX ORDER — LIDOCAINE HYDROCHLORIDE 10 MG/ML
0.1 INJECTION, SOLUTION EPIDURAL; INFILTRATION; INTRACAUDAL; PERINEURAL AS NEEDED
Status: DISCONTINUED | OUTPATIENT
Start: 2023-02-07 | End: 2023-02-07 | Stop reason: HOSPADM

## 2023-02-07 RX ORDER — SODIUM CHLORIDE 9 MG/ML
25 INJECTION, SOLUTION INTRAVENOUS ONCE
Status: COMPLETED | OUTPATIENT
Start: 2023-02-07 | End: 2023-02-07

## 2023-02-07 RX ORDER — SODIUM CHLORIDE 0.9 % (FLUSH) 0.9 %
5-40 SYRINGE (ML) INJECTION EVERY 8 HOURS
Status: DISCONTINUED | OUTPATIENT
Start: 2023-02-07 | End: 2023-02-07 | Stop reason: HOSPADM

## 2023-02-07 RX ORDER — SODIUM CHLORIDE, SODIUM LACTATE, POTASSIUM CHLORIDE, CALCIUM CHLORIDE 600; 310; 30; 20 MG/100ML; MG/100ML; MG/100ML; MG/100ML
25 INJECTION, SOLUTION INTRAVENOUS CONTINUOUS
Status: DISCONTINUED | OUTPATIENT
Start: 2023-02-07 | End: 2023-02-07 | Stop reason: HOSPADM

## 2023-02-07 RX ORDER — PROPOFOL 10 MG/ML
INJECTION, EMULSION INTRAVENOUS AS NEEDED
Status: DISCONTINUED | OUTPATIENT
Start: 2023-02-07 | End: 2023-02-07 | Stop reason: HOSPADM

## 2023-02-07 RX ORDER — PHENYLEPHRINE HYDROCHLORIDE 25 MG/ML
1 SOLUTION/ DROPS OPHTHALMIC
Status: COMPLETED | OUTPATIENT
Start: 2023-02-07 | End: 2023-02-07

## 2023-02-07 RX ORDER — TROPICAMIDE 10 MG/ML
1 SOLUTION/ DROPS OPHTHALMIC
Status: COMPLETED | OUTPATIENT
Start: 2023-02-07 | End: 2023-02-07

## 2023-02-07 RX ORDER — CYCLOPENTOLATE HYDROCHLORIDE 20 MG/ML
1 SOLUTION/ DROPS OPHTHALMIC
Status: COMPLETED | OUTPATIENT
Start: 2023-02-07 | End: 2023-02-07

## 2023-02-07 RX ORDER — DICLOFENAC SODIUM 1 MG/ML
1 SOLUTION/ DROPS OPHTHALMIC
Status: COMPLETED | OUTPATIENT
Start: 2023-02-07 | End: 2023-02-07

## 2023-02-07 RX ORDER — SODIUM CHLORIDE 0.9 % (FLUSH) 0.9 %
5-40 SYRINGE (ML) INJECTION AS NEEDED
Status: DISCONTINUED | OUTPATIENT
Start: 2023-02-07 | End: 2023-02-07 | Stop reason: HOSPADM

## 2023-02-07 RX ORDER — PHENYLEPHRINE HYDROCHLORIDE 25 MG/ML
SOLUTION/ DROPS OPHTHALMIC
Status: COMPLETED
Start: 2023-02-07 | End: 2023-02-07

## 2023-02-07 RX ORDER — TROPICAMIDE 10 MG/ML
SOLUTION/ DROPS OPHTHALMIC
Status: COMPLETED
Start: 2023-02-07 | End: 2023-02-07

## 2023-02-07 RX ORDER — SODIUM CHLORIDE 9 MG/ML
INJECTION, SOLUTION INTRAVENOUS
Status: DISCONTINUED | OUTPATIENT
Start: 2023-02-07 | End: 2023-02-07 | Stop reason: HOSPADM

## 2023-02-07 RX ORDER — NEOMYCIN SULFATE, POLYMYXIN B SULFATE, AND DEXAMETHASONE 3.5; 10000; 1 MG/G; [USP'U]/G; MG/G
OINTMENT OPHTHALMIC 4 TIMES DAILY
Status: DISCONTINUED | OUTPATIENT
Start: 2023-02-07 | End: 2023-02-07 | Stop reason: HOSPADM

## 2023-02-07 RX ORDER — TETRACAINE HYDROCHLORIDE 5 MG/ML
1 SOLUTION OPHTHALMIC
Status: DISCONTINUED | OUTPATIENT
Start: 2023-02-07 | End: 2023-02-07 | Stop reason: HOSPADM

## 2023-02-07 RX ORDER — LIDOCAINE HYDROCHLORIDE 20 MG/ML
INJECTION, SOLUTION EPIDURAL; INFILTRATION; INTRACAUDAL; PERINEURAL AS NEEDED
Status: DISCONTINUED | OUTPATIENT
Start: 2023-02-07 | End: 2023-02-07 | Stop reason: HOSPADM

## 2023-02-07 RX ADMIN — CYCLOPENTOLATE HYDROCHLORIDE 1 DROP: 20 SOLUTION/ DROPS OPHTHALMIC at 10:28

## 2023-02-07 RX ADMIN — PROPOFOL 40 MG: 10 INJECTION, EMULSION INTRAVENOUS at 10:55

## 2023-02-07 RX ADMIN — TROPICAMIDE 1 DROP: 10 SOLUTION/ DROPS OPHTHALMIC at 10:29

## 2023-02-07 RX ADMIN — CYCLOPENTOLATE HYDROCHLORIDE 1 DROP: 20 SOLUTION/ DROPS OPHTHALMIC at 10:13

## 2023-02-07 RX ADMIN — LIDOCAINE HYDROCHLORIDE 20 MG: 20 INJECTION, SOLUTION EPIDURAL; INFILTRATION; INTRACAUDAL; PERINEURAL at 10:55

## 2023-02-07 RX ADMIN — TROPICAMIDE 1 DROP: 10 SOLUTION/ DROPS OPHTHALMIC at 10:14

## 2023-02-07 RX ADMIN — DICLOFENAC SODIUM 1 DROP: 1 SOLUTION/ DROPS OPHTHALMIC at 10:18

## 2023-02-07 RX ADMIN — CYCLOPENTOLATE HYDROCHLORIDE 1 DROP: 20 SOLUTION/ DROPS OPHTHALMIC at 10:18

## 2023-02-07 RX ADMIN — PROPARACAINE HYDROCHLORIDE 1 DROP: 5 SOLUTION/ DROPS OPHTHALMIC at 10:13

## 2023-02-07 RX ADMIN — PHENYLEPHRINE HYDROCHLORIDE 1 DROP: 25 SOLUTION/ DROPS OPHTHALMIC at 10:29

## 2023-02-07 RX ADMIN — PHENYLEPHRINE HYDROCHLORIDE 1 DROP: 25 SOLUTION/ DROPS OPHTHALMIC at 10:18

## 2023-02-07 RX ADMIN — DICLOFENAC SODIUM 1 DROP: 1 SOLUTION/ DROPS OPHTHALMIC at 10:14

## 2023-02-07 RX ADMIN — SODIUM CHLORIDE 25 ML/HR: 9 INJECTION, SOLUTION INTRAVENOUS at 10:18

## 2023-02-07 RX ADMIN — TROPICAMIDE 1 DROP: 10 SOLUTION/ DROPS OPHTHALMIC at 10:18

## 2023-02-07 RX ADMIN — DICLOFENAC SODIUM 1 DROP: 1 SOLUTION/ DROPS OPHTHALMIC at 10:28

## 2023-02-07 RX ADMIN — SODIUM CHLORIDE: 0.9 INJECTION, SOLUTION INTRAVENOUS at 10:44

## 2023-02-07 RX ADMIN — PROPARACAINE HYDROCHLORIDE 1 DROP: 5 SOLUTION/ DROPS OPHTHALMIC at 10:29

## 2023-02-07 RX ADMIN — PHENYLEPHRINE HYDROCHLORIDE 1 DROP: 25 SOLUTION/ DROPS OPHTHALMIC at 10:13

## 2023-02-07 NOTE — PERIOP NOTES
Edda Marina  1945  [id number]    Situation:  Verbal report given from: kameron worthington and [facility]ts[facility] crna  Procedure: Procedure(s):  RIGHT EYE PHACOEMULSIFICATION WITH INTRA OCULAR LENS IMPLANT (MAC WITH RETROBULBAR)    Background:    Preoperative diagnosis: Combined forms of age-related cataract of right eye [H25.811]    Postoperative diagnosis: Combined forms of age-related cataract of right eye [H25.811]    :  Dr. Darlene Fenton): Circ-1: Ady Green RN  Circ-2: Bonnielee Holter, RN; Diana Wynn RN  Circ-Relief: Stephanie Avery RN  Scrub Tech-1: Mari Mittal    Specimens: * No specimens in log *    Assessment:  Intra-procedure medications         Anesthesia gave intra-procedure sedation and medications, see anesthesia flow sheet     Intravenous fluids: ns@ K[facility]     Vital signs stable       Recommendation:    Permission to share finding with wife : yes

## 2023-02-07 NOTE — PERIOP NOTES
Permission received to review discharge instructions and discuss private health information with wife and will have someone with them after discharge

## 2023-02-07 NOTE — ANESTHESIA POSTPROCEDURE EVALUATION
Procedure(s):  RIGHT EYE PHACOEMULSIFICATION WITH INTRA OCULAR LENS IMPLANT (MAC WITH RETROBULBAR).     MAC    Anesthesia Post Evaluation      Multimodal analgesia: multimodal analgesia used between 6 hours prior to anesthesia start to PACU discharge  Patient location during evaluation: PACU  Patient participation: complete - patient participated  Level of consciousness: awake and alert  Pain score: 0  Airway patency: patent  Anesthetic complications: no  Cardiovascular status: acceptable  Respiratory status: acceptable  Hydration status: acceptable  Post anesthesia nausea and vomiting:  none  Final Post Anesthesia Temperature Assessment:  Normothermia (36.0-37.5 degrees C)      INITIAL Post-op Vital signs:   Vitals Value Taken Time   /93 02/07/23 1130   Temp 36.4 °C (97.5 °F) 02/07/23 1119   Pulse 64 02/07/23 1130   Resp 18 02/07/23 1130   SpO2 97 % 02/07/23 1130

## 2023-02-07 NOTE — DISCHARGE INSTRUCTIONS
Dr. Tosha Ruiz and [address].  [address]  [phone]    Cataract Post Operative Instructions    Diet - you may eat a normal diet but avoid spicy or greasy tonight. Activity - Limit heavy lifting - do not lift more than 20 pounds for the next 7 days. Leave your eye patch on tonight. Your eye should remain closed under the patch. Your patch will be removed in Dr. Jesica Garza office tomorrow. Most people do not have significant pain after cataract surgery. You may take any over-the-counter pain medication that you normally take as needed. You may resume all of your pre-operative medications. You should have your postoperative drops. If you do not[facility] You will start eyedrops TOMORROW. You have an appointment with Dr. Gamaliel Rm tomorrow in her office. Date: _____2/8________ Time: _____2:15 pm_____    If you need to speak to Dr. Gamaliel Rm after business hours, please call [phone]. DO NOT TAKE SLEEPING MEDICATIONS OR ANTIANXIETY MEDICATIONS WHILE TAKING NARCOTIC PAIN MEDICATIONS,  ESPECIALLY THE NIGHT OF ANESTHESIA. CPAP PATIENTS BE SURE TO WEAR MACHINE WHENEVER NAPPING OR SLEEPING. DISCHARGE SUMMARY from Nurse    The following personal items collected during your admission are returned to you:   Dental Appliance: Dental Appliances: None  Vision: Visual Aid: Glasses  Hearing Aid:    Jewelry:    Clothing:    Other Valuables:    Valuables sent to safe:        PATIENT INSTRUCTIONS:    Anesthesia Discharge Instructions for Procedural Area requiring Sedation (MAC Anesthesia, Cath Lab, Endo and Radiology): You have been given medications during your procedure that may affect your memory and mental judgement for the next 24 hours.  During this time frame for your safety, please follow the instructions listed below :   Have a responsible adult to drive you home and be with you for at least 24 hours. Rest today and resume normal activities tomorrow. Start with a soft bland diet and advance as tolerated to your recommended diet. Do not drive any motor vehicle or operate mechanical or electrical equipment prior to [employer]. Avoid making critical decisions or signing legal documents prior to 6am tomorrow. Do not drink alcohol prior to 6am tomorrow. If you have sleep apnea and you plan to go home and take a nap, please use your CPAP machine not only at bedtime, but also while napping for 24 hours. If you notice any redness or swelling on parts of your body where IV medications were given, place a warm wet washcloth over the area for 20 minutes at a time until the redness or swelling goes away. If you still have redness or swelling after 2-3 days, please call us. You will receive a Post Operative Call from one of the Recovery Room Nurses on the day after your surgery to check on you. It is very important for us to know how you are recovering after your surgery. If you have an issue or need to speak with someone, please call your surgeon, do not wait for the post operative call. You may receive an e-mail or letter in the mail from [employer] regarding your experience with us in the Ambulatory Surgery Unit. Your feedback is valuable to us and we appreciate your participation in the survey. We wish you a speedy recovery ? What to do at Home:      *  Please give a list of your current medications to your Primary Care Provider. *  Please update this list whenever your medications are discontinued, doses are      changed, or new medications (including over-the-counter products) are added. *  Please carry medication information at all times in case of emergency situations. If you have not received your influenza and/or pneumococcal vaccine, please follow up with your primary care physician.       The discharge information has been reviewed with the patient and caregiver. The patient and caregiver verbalized understanding.

## 2023-02-07 NOTE — BRIEF OP NOTE
Brief Postoperative Note    Patient: Jaun Browne  YOB: 1945  MRN: [id number]    Date of Procedure: 2/7/2023     Pre-Op Diagnosis: Combined forms of age-related cataract of right eye [H25.811]    Post-Op Diagnosis: Same as preoperative diagnosis. Procedure(s):  RIGHT EYE PHACOEMULSIFICATION WITH INTRA OCULAR LENS IMPLANT (MAC WITH RETROBULBAR)    Surgeon(s):  Mukul Paige MD    Surgical Assistant: None    Anesthesia: MAC     Estimated Blood Loss (mL): Minimal    Complications: None    Specimens: * No specimens in log *     Implants:   Implant Name Type Inv.  Item Serial No.  Lot No. LRB No. Used Action   LENS IOL POST 1-PC 6X13 20.0 -- [employer] - [id number]  LENS IOL POST 1-PC 6X13 20.0 -- [employer] [id number] [employer]  Right 1 Implanted       Drains: * No LDAs found *    Findings: none    Electronically Signed by Yudith Morfin MD on 2/7/2023 at 1:54 PM

## 2023-02-07 NOTE — PERIOP NOTES
1146 pt A+Ox4, denies complaints. tolerated pt fluids without diff. All d/c instructions reviewed with wife. Assisted pt to wheelchair. Discharged to car via wheelchair. home with wife. All belongings with pt.

## 2023-02-07 NOTE — OP NOTES
Operative Note  Operative Report    Patient: Adriana Vicente MRN: [id number]  SSN: [id number]    YOB: 1945  Age: 68 y.o. Sex: male      Indications: The patient complains of painless progressive visual loss and reports difficulty with activities of daily living. Cataract surgery has been recommended to improve vision for activities of daily living, and the patient has elected to proceed. Date of Surgery: 2/7/2023     Preoperative Diagnosis: Combined forms of age-related cataract of right eye [H25.811]    Postoperative Diagnosis: Combined forms of age-related cataract of right eye [H25.811]    Surgeon(s) and Role:     * Terry Steel MD - Primary     Anesthesia:  MAC     Procedure: Procedure(s):  RIGHT EYE PHACOEMULSIFICATION WITH INTRA OCULAR LENS IMPLANT (MAC WITH RETROBULBAR)     Procedure in Detail:  After informed consent was obtained, the patient was brought into the operating suite. MAC with sedation and a retrobulbar block using a 50/50 mixture of lidocaine 2% and Marcaine 0.75% with added Amphatase was administered without complication. The patient was prepped and draped in the usual sterile ophthalmic fashion. A wire lid speculum was placed. A paracentesis was made using a 75 blade. The eye was filled with Provisc. A 2.8mm keratome was used to make a temporal clear corneal incision. A cystotome and Utrada forcep was used to make a continuous curvilinear capsulorrhexis without complication. Hydrodisection was performed until the nuclear rotated freely in the capsular bag. The cataract was removed using a two handed divide and conquer technique using a Brushy as a second instrument. Irrigation/aspiration was used to remove the remaining cortex. Capsular polish was used as needed. The bag was inflated using Provisc. An intraocular lens was placed into the bag without complication. Irrigation/aspiration was used to remove the viscoelastic.   The chamber was filled with BSS and the wound hydrated. The wound was found to be watertight. Subconjunctival injections of ancef and decadron were given. The lid speculum and drape was removed. The eye was dressed with ointment, eye pads, and eye shield. The patient was taken to the recover room having tolerated the procedure well. Findings:  None         Estimated Blood Loss:  Minimal    Specimens: * No specimens in log *                Implants:   Implant Name Type Inv.  Item Serial No.  Lot No. LRB No. Used Action   LENS IOL POST 1-PC 6X13 20.0 -- [employer] - [id number]  LENS IOL POST 1-PC 6X13 20.0 -- [employer] [id number] [employer]WD  Right 1 Implanted              Complications:  None

## 2023-02-07 NOTE — ANESTHESIA PREPROCEDURE EVALUATION
Relevant Problems   No relevant active problems       Anesthetic History   No history of anesthetic complications            Review of Systems / Medical History  Patient summary reviewed, nursing notes reviewed and pertinent labs reviewed    Pulmonary  Within defined limits                 Neuro/Psych   Within defined limits           Cardiovascular    Hypertension          CAD, PAD and hyperlipidemia         GI/Hepatic/Renal         Renal disease       Endo/Other  Within defined limits           Other Findings   Comments: Cataracts    ETOH: 10/week    Neutropenia           Physical Exam    Airway  Mallampati: II  TM Distance: > 6 cm  Neck ROM: normal range of motion   Mouth opening: Normal     Cardiovascular    Rhythm: regular  Rate: normal         Dental  No notable dental hx       Pulmonary  Breath sounds clear to auscultation               Abdominal  GI exam deferred       Other Findings            Anesthetic Plan    ASA: 3  Anesthesia type: MAC          Induction: Intravenous  Anesthetic plan and risks discussed with: Patient

## (undated) DEVICE — PACK CATRCT CUST AS835752] ALCON LABORATORIES INC]

## (undated) DEVICE — SLIT FULL HDL-2.8MM ANG C-CUT: Brand: SHARPOINT

## (undated) DEVICE — NDL HYPO NSAF 30GX.5IN DISP --

## (undated) DEVICE — AGENT VISCOELASTIC SODIUM HYALURONATE 10 MG/ML PROVISC

## (undated) DEVICE — SOLUTION IRRIG 500ML STRL H2O NONPYROGENIC

## (undated) DEVICE — SYRINGE MED 10ML LUERLOCK TIP W/O SFTY DISP

## (undated) DEVICE — SYRINGE MED 30ML STD CLR PLAS LUERLOCK TIP N CTRL DISP

## (undated) DEVICE — SYR LR LCK 1ML GRAD NSAF 30ML --

## (undated) DEVICE — CATHETER ETER IV 22GA L1IN POLYUR STR RADPQ INTROCAN SFTY

## (undated) DEVICE — GLOVE SURG SZ 65 THK91MIL LTX FREE SYN POLYISOPRENE

## (undated) DEVICE — NEEDLE HYPO 25GA L1.5IN BVL ORIENTED ECLIPSE

## (undated) DEVICE — SOL INJ SOD CL 0.9% 250ML BG --

## (undated) DEVICE — ADULT SPO2 SENSOR: Brand: NELLCOR

## (undated) DEVICE — KENDALL DL 5 LEADS DUAL CONNECT SYSTEM BLENDED CASE - SINGLE-PATIENT-USE: Brand: SUSTAINABLE TECHNOLOGIES